# Patient Record
Sex: FEMALE | URBAN - METROPOLITAN AREA
[De-identification: names, ages, dates, MRNs, and addresses within clinical notes are randomized per-mention and may not be internally consistent; named-entity substitution may affect disease eponyms.]

---

## 2019-12-11 ENCOUNTER — NURSE TRIAGE (OUTPATIENT)
Dept: NURSING | Facility: CLINIC | Age: 64
End: 2019-12-11

## 2019-12-12 NOTE — TELEPHONE ENCOUNTER
Patient calling with general questions about flu and flu shot. States got flu shot and grandson was dx with influenza B. 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 Instructed patient that many strains of flu and cannot say for certain she is protected. Patient taught standard precautions to follow, and maybe call her boss to see if she wants her working. Verbalizes understanding.     Additional Information    Negative: Nursing judgment    Negative: Nursing judgment    Negative: Nursing judgment    Negative: Information only question and nurse able to answer    Protocols used: NO PROTOCOL AVAILABLE - INFORMATION ONLY-A-[place]